# Patient Record
Sex: FEMALE | Race: WHITE | Employment: FULL TIME | URBAN - METROPOLITAN AREA
[De-identification: names, ages, dates, MRNs, and addresses within clinical notes are randomized per-mention and may not be internally consistent; named-entity substitution may affect disease eponyms.]

---

## 2018-06-11 ENCOUNTER — HOSPITAL ENCOUNTER (OUTPATIENT)
Dept: GENERAL RADIOLOGY | Age: 33
Discharge: HOME | End: 2018-06-11
Payer: COMMERCIAL

## 2018-06-11 DIAGNOSIS — M25.529 ELBOW PAIN: ICD-10-CM

## 2018-06-11 PROCEDURE — 73080 X-RAY EXAM OF ELBOW: CPT

## 2023-01-17 ENCOUNTER — OFFICE VISIT (OUTPATIENT)
Dept: ORTHOPEDIC SURGERY | Age: 38
End: 2023-01-17
Payer: COMMERCIAL

## 2023-01-17 VITALS — BODY MASS INDEX: 18.19 KG/M2 | HEIGHT: 68 IN | WEIGHT: 120 LBS

## 2023-01-17 DIAGNOSIS — M25.521 RIGHT ELBOW PAIN: Primary | ICD-10-CM

## 2023-01-17 DIAGNOSIS — M77.11 RIGHT LATERAL EPICONDYLITIS: ICD-10-CM

## 2023-01-17 PROCEDURE — 20551 NJX 1 TENDON ORIGIN/INSJ: CPT | Performed by: PHYSICIAN ASSISTANT

## 2023-01-17 PROCEDURE — 99203 OFFICE O/P NEW LOW 30 MIN: CPT | Performed by: PHYSICIAN ASSISTANT

## 2023-01-17 RX ORDER — BETAMETHASONE SODIUM PHOSPHATE AND BETAMETHASONE ACETATE 3; 3 MG/ML; MG/ML
6 INJECTION, SUSPENSION INTRA-ARTICULAR; INTRALESIONAL; INTRAMUSCULAR; SOFT TISSUE ONCE
Status: COMPLETED | OUTPATIENT
Start: 2023-01-17 | End: 2023-01-17

## 2023-01-17 RX ORDER — BUPIVACAINE HYDROCHLORIDE 7.5 MG/ML
7.5 INJECTION, SOLUTION EPIDURAL; RETROBULBAR ONCE
Status: COMPLETED | OUTPATIENT
Start: 2023-01-17 | End: 2023-01-17

## 2023-01-17 RX ORDER — ESCITALOPRAM OXALATE 10 MG/1
10 TABLET ORAL DAILY
COMMUNITY

## 2023-01-17 RX ADMIN — BUPIVACAINE HYDROCHLORIDE 7.5 MG: 7.5 INJECTION, SOLUTION EPIDURAL; RETROBULBAR at 15:08

## 2023-01-17 RX ADMIN — BETAMETHASONE SODIUM PHOSPHATE AND BETAMETHASONE ACETATE 6 MG: 3; 3 INJECTION, SUSPENSION INTRA-ARTICULAR; INTRALESIONAL; INTRAMUSCULAR; SOFT TISSUE at 15:07

## 2023-01-17 NOTE — PROGRESS NOTES
HPI: Marie Treviño (: 1985) is a 44 y.o. female, patient, here for evaluation of the following chief complaint(s): Presents with complaint of right upper extremity pain with most of the pain being concentrated to the elbow. This has been bothering her for approximately 1 year. 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 She denies neck pain. No injury/trauma. On occasion she will have numbness and tingling in the extremity when the pain is at its worst.  She has not seen anyone else for this issue. She has tried a new mouse and the use of a compression sleeve with incomplete relief. No other complaints or concerns. Arm Pain (Right )       Vitals:  Ht 5' 8\" (1.727 m)   Wt 120 lb (54.4 kg)   BMI 18.25 kg/m²    Body mass index is 18.25 kg/m². Allergies   Allergen Reactions    Ceclor [Cefaclor] Hives    Fruit C [Ascorbic Acid] Hives    Raw Vegetable Hives       Current Outpatient Medications   Medication Sig    escitalopram oxalate (LEXAPRO) 10 mg tablet Take 10 mg by mouth daily. No current facility-administered medications for this visit. No past medical history on file. No past surgical history on file. No family history on file. Review of Systems    Constitutional: No fevers, chills, night sweats, excessive fatigue or weight loss. Musculoskeletal: + Right elbow pain. Neurologic: No headache, blurred vision, and no areas of focal weakness or numbness. Normal gait. No sensory problems. Respiratory: No dyspnea on exertion, orthopnea, chest pain, cough or hemoptysis. Cardiovascular: No anginal chest pain, irregular heart beat, tachycardia, palpitations or orthopnea  Integumentary: No chronic rashes, inflammation, ulcerations, pruritus, petechiae, purpura, ecchymoses, or skin changes       Physical Exam    General: Alert, cooperative, no distress  Musculosketal: Right hand - Full range of motion.   Normal sensation. No erythema, edema or warmth to the joints. No cysts. Right wrist - Full range of motion. Normal sensation. No erythema, edema or warmth to the joints. No cysts. Negative Tinel's, Phalen's and Finkelstein's test.  Right elbow - Normal range of motion. Normal sensation. No erythema, edema or warmth to the joint. No fluid collection. Negative hook test. No sign of triceps tendon disruption. Tenderness to palpation at the lateral epicondyle. No tenderness to palpation at the medial epicondyle. Discomfort reproduced with resisted extension and flexion. Neurologic:  CNII-XII intact, Normal strength, sensation, and reflexes throughout    Imaging:  None today       ASSESSMENT/PLAN:  Below is the assessment and plan developed based on review of pertinent history, physical exam, labs, studies, and medications. Right upper extremity pain with most of the pain being concentrated to the elbow. This has been bothering her for approximately 1 year. 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 On occasion she will have numbness and tingling in the extremity when the pain is at its worst.  She has not seen anyone else for this issue. Clinical exam is consistent with right lateral epicondylitis. Treatment options reviewed with the patient and she opted for therapeutic injection. Injection administered to the right elbow 1/17/23. She will also try an elbow compression band and home exercises which were provided. She will follow up in 3-4 weeks to review her progress as needed. Patient consent obtained prior to the injection(s). Consent forms signed. Discussed risks/benefits of cortisone injection and patient gave verbal consent. Under sterile conditions, the right elbow epicondylar corticosteroid injection was performed with 1 cc 0.75% Bupivacaine and 1cc 6mg Betamethasone. She tolerated the procedure well. 1. Right elbow pain  2. Right lateral epicondylitis  -     INJECT TENDON SHEATH/LIGAMENT  -     betamethasone (CELESTONE) injection 6 mg; 6 mg, Other, ONCE, 1 dose, On Tue 1/17/23 at 1600  -     bupivacaine (PF) (MARCAINE) 0.75 % (7.5 mg/mL) injection 7.5 mg; 7.5 mg, SubCUTAneous, ONCE, 1 dose, On Tue 1/17/23 at 1600    Return in about 3 weeks (around 2/7/2023). Dr. Ita Bolton was available for immediate consult during this encounter. An electronic signature was used to authenticate this note.   -- Mouna Michael PA-C